# Patient Record
Sex: MALE | Race: WHITE | Employment: STUDENT | URBAN - METROPOLITAN AREA
[De-identification: names, ages, dates, MRNs, and addresses within clinical notes are randomized per-mention and may not be internally consistent; named-entity substitution may affect disease eponyms.]

---

## 2017-03-13 ENCOUNTER — TELEPHONE (OUTPATIENT)
Dept: PEDIATRICS CLINIC | Facility: CLINIC | Age: 22
End: 2017-03-13

## 2017-03-13 NOTE — TELEPHONE ENCOUNTER
States was @ [facility] given RX motrin, cough meds, Tamiflu, now vomitting,diarrhea, advised to call [facility]

## 2017-03-14 ENCOUNTER — TELEPHONE (OUTPATIENT)
Dept: PEDIATRICS CLINIC | Facility: CLINIC | Age: 22
End: 2017-03-14

## 2017-03-14 NOTE — TELEPHONE ENCOUNTER
Pt was seen in urgent care on Sunday for fever, ST, cough. Started on Tamiflu. After starting Tamiflu pt developed vomiting and diarrhea so he stopped Tamiflu. Still with fever and cough. Informed mom will review with [facility] if okay to see patient.  Advised mo

## 2017-03-15 ENCOUNTER — TELEPHONE (OUTPATIENT)
Dept: PEDIATRICS CLINIC | Facility: CLINIC | Age: 22
End: 2017-03-15

## 2017-03-15 ENCOUNTER — OFFICE VISIT (OUTPATIENT)
Dept: PEDIATRICS CLINIC | Facility: CLINIC | Age: 22
End: 2017-03-15

## 2017-03-15 VITALS
TEMPERATURE: 98 F | WEIGHT: 232 LBS | DIASTOLIC BLOOD PRESSURE: 72 MMHG | RESPIRATION RATE: 16 BRPM | BODY MASS INDEX: 27 KG/M2 | SYSTOLIC BLOOD PRESSURE: 107 MMHG | HEART RATE: 80 BPM

## 2017-03-15 DIAGNOSIS — B34.9 VIRAL INFECTION: Primary | ICD-10-CM

## 2017-03-15 DIAGNOSIS — J03.90 TONSILLITIS: ICD-10-CM

## 2017-03-15 PROCEDURE — 99213 OFFICE O/P EST LOW 20 MIN: CPT | Performed by: PEDIATRICS

## 2017-03-15 RX ORDER — OSELTAMIVIR PHOSPHATE 75 MG/1
CAPSULE ORAL
Refills: 0 | COMMUNITY
Start: 2017-03-12 | End: 2020-07-20

## 2017-03-15 RX ORDER — BENZONATATE 200 MG/1
CAPSULE ORAL
Refills: 0 | COMMUNITY
Start: 2017-03-12 | End: 2020-07-20

## 2017-03-15 RX ORDER — IBUPROFEN 800 MG/1
TABLET ORAL
Refills: 0 | COMMUNITY
Start: 2017-03-12

## 2017-03-15 NOTE — TELEPHONE ENCOUNTER
[facility], faxed request letter requesting labs for strep cx and flu result from 3/12 to fax # [phone], left message for mom awaiting result for [facility] to review.

## 2017-03-15 NOTE — PROGRESS NOTES
Lani Martinez is a 25year old male who was brought in for this visit. History was provided by the mother.   HPI:   Patient presents with:  Fever: began on 3/11 with fever; T104; also had congestion, cough; ST began later \"from coughing\"  Lost his voice visit:    Viral infection    Tonsillitis    likely adenovirus with all the symptoms he is experiencing  PLAN:  Patient Instructions   Fever is a normal mechanism of the body to help fight infection.  It slows the person down, promoting rest, and tolbert the would be a child with a history of febrile seizures)  · It is best to avoid the use of aspirin due to the chance of serious complications that can occur if used with certain infections (chicken pox and influenza)    Here are a few things that may help a co

## 2017-03-15 NOTE — TELEPHONE ENCOUNTER
Mother states the results [facility] wanted from [address] has to be requested by doctor not pt.  Pl adv

## 2017-03-15 NOTE — PATIENT INSTRUCTIONS
Fever is a normal mechanism of the body to help fight infection. It slows the person down, promoting rest, and tolbert the body's immune system. Common fevers will NOT cause brain damage.  Children with fever will be fussy and sluggish but they should perk u complications that can occur if used with certain infections (chicken pox and influenza)    Here are a few things that may help a cough:  · Cool vaporizers/humidifiers may help during the winter when the air is dry but I do not recommend them in the spring

## 2017-03-16 NOTE — TELEPHONE ENCOUNTER
Informed mom received strep cx result from [facility] urgent care, result negative and had reviewed with [facility], mom said they did a rapid flu test in the office and it was negative for influenza A and B.  Advised mom to follow [facility] instructions, call if no major i

## 2017-07-24 ENCOUNTER — OFFICE VISIT (OUTPATIENT)
Dept: INTERNAL MEDICINE CLINIC | Facility: CLINIC | Age: 22
End: 2017-07-24

## 2017-07-24 VITALS
WEIGHT: 232.63 LBS | HEIGHT: 78 IN | HEART RATE: 52 BPM | RESPIRATION RATE: 16 BRPM | DIASTOLIC BLOOD PRESSURE: 77 MMHG | SYSTOLIC BLOOD PRESSURE: 121 MMHG | BODY MASS INDEX: 26.92 KG/M2

## 2017-07-24 DIAGNOSIS — Z00.00 ANNUAL PHYSICAL EXAM: Primary | ICD-10-CM

## 2017-07-24 PROCEDURE — 99395 PREV VISIT EST AGE 18-39: CPT | Performed by: INTERNAL MEDICINE

## 2017-07-24 NOTE — PROGRESS NOTES
HPI:    Patient ID: Smiley Heath is a 25year old male. HPI     Annual Preventative Exam  Art Antoine arrives today for annual preventative physical examination. The patient complains of no new problems and has 0/10 pain.        Review of Systems   Co rhythm and normal heart sounds. Exam reveals no gallop and no friction rub. No murmur heard. Pulmonary/Chest: Effort normal and breath sounds normal. No respiratory distress. He has no wheezes. He has no rales. Abdominal: Soft.  Bowel sounds are norm instructions (if applicable) and agree that the record reflects my personal performance and is accurate and complete.   Rosalie Dickerson MD, 7/24/2017, 4:43 PM

## 2017-10-12 ENCOUNTER — TELEPHONE (OUTPATIENT)
Dept: INTERNAL MEDICINE CLINIC | Facility: CLINIC | Age: 22
End: 2017-10-12

## 2017-10-14 NOTE — TELEPHONE ENCOUNTER
Please offer appointment for Flu clinic today at [facility] or [facility].

## 2020-02-18 ENCOUNTER — TELEPHONE (OUTPATIENT)
Dept: PEDIATRICS CLINIC | Facility: CLINIC | Age: 25
End: 2020-02-18

## 2020-02-18 NOTE — TELEPHONE ENCOUNTER
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 Chito Castillo is having trouble registering for college classes because the college needs proof that Chito Castillo had varicella disease in his childhood.      IM-please keep an eye out for a college immunizati

## 2020-02-19 NOTE — TELEPHONE ENCOUNTER
Pt needs to establish care with an IM PCP before any forms will be signed or orders generated. LMTCB    ASHLEY - please assist with scheduling appt. (pt can see Cee Ramirez to settle issue with form if he cannot wait to see MOE).

## 2020-07-20 ENCOUNTER — OFFICE VISIT (OUTPATIENT)
Dept: INTERNAL MEDICINE CLINIC | Facility: CLINIC | Age: 25
End: 2020-07-20
Payer: COMMERCIAL

## 2020-07-20 VITALS
WEIGHT: 261.5 LBS | SYSTOLIC BLOOD PRESSURE: 122 MMHG | HEART RATE: 72 BPM | DIASTOLIC BLOOD PRESSURE: 84 MMHG | HEIGHT: 78 IN | BODY MASS INDEX: 30.25 KG/M2

## 2020-07-20 DIAGNOSIS — Z00.00 ROUTINE HISTORY AND PHYSICAL EXAMINATION OF ADULT: Primary | ICD-10-CM

## 2020-07-20 PROCEDURE — 99385 PREV VISIT NEW AGE 18-39: CPT | Performed by: INTERNAL MEDICINE

## 2020-07-21 NOTE — PROGRESS NOTES
Mayme Canavan is a 27year old male who presents for a complete physical exam.   HPI:   Pt complains of nothing.       Immunization History  Administered            Date(s) Administered    >=3 YRS FLUZONE OR FLUARIX QUAD PRESERVE FREE SINGLE DOSE (59912) F AST  No results found for: ALT  No results found for: PSA     Current Outpatient Medications   Medication Sig Dispense Refill   • ibuprofen 800 MG Oral Tab   0      Past Medical History:   Diagnosis Date   • Umbilical hernia 0581      Past Surgical History mass  LUNGS: clear to auscultation  CARDIO: RRR without murmur  GI: good BS's,no masses, HSM or tenderness  : two descended testes,no masses,no hernia,no penile lesions  RECTAL: good rectal tone, prostate shows no masses, stool is OB negative  M[place]

## 2020-08-18 ENCOUNTER — LAB ENCOUNTER (OUTPATIENT)
Dept: LAB | Facility: REFERENCE LAB | Age: 25
End: 2020-08-18
Attending: INTERNAL MEDICINE
Payer: COMMERCIAL

## 2020-08-18 DIAGNOSIS — Z00.00 ROUTINE HISTORY AND PHYSICAL EXAMINATION OF ADULT: ICD-10-CM

## 2020-08-18 LAB
ALBUMIN SERPL-MCNC: 4 G/DL (ref 3.4–5)
ALBUMIN/GLOB SERPL: 1.1 {RATIO} (ref 1–2)
ALP LIVER SERPL-CCNC: 84 U/L (ref 45–117)
ALT SERPL-CCNC: 28 U/L (ref 16–61)
ANION GAP SERPL CALC-SCNC: 5 MMOL/L (ref 0–18)
AST SERPL-CCNC: 11 U/L (ref 15–37)
BASOPHILS # BLD AUTO: 0.03 X10(3) UL (ref 0–0.2)
BASOPHILS NFR BLD AUTO: 0.4 %
BILIRUB SERPL-MCNC: 0.6 MG/DL (ref 0.1–2)
BILIRUB UR QL: NEGATIVE
BUN BLD-MCNC: 10 MG/DL (ref 7–18)
BUN/CREAT SERPL: 10.1 (ref 10–20)
CALCIUM BLD-MCNC: 8.7 MG/DL (ref 8.5–10.1)
CHLORIDE SERPL-SCNC: 107 MMOL/L (ref 98–112)
CHOLEST SMN-MCNC: 132 MG/DL (ref ?–200)
CLARITY UR: CLEAR
CO2 SERPL-SCNC: 27 MMOL/L (ref 21–32)
COLOR UR: YELLOW
CREAT BLD-MCNC: 0.99 MG/DL (ref 0.7–1.3)
DEPRECATED RDW RBC AUTO: 37.8 FL (ref 35.1–46.3)
EOSINOPHIL # BLD AUTO: 0.4 X10(3) UL (ref 0–0.7)
EOSINOPHIL NFR BLD AUTO: 5.7 %
ERYTHROCYTE [DISTWIDTH] IN BLOOD BY AUTOMATED COUNT: 12.3 % (ref 11–15)
GLOBULIN PLAS-MCNC: 3.7 G/DL (ref 2.8–4.4)
GLUCOSE BLD-MCNC: 74 MG/DL (ref 70–99)
GLUCOSE UR-MCNC: NEGATIVE MG/DL
HCT VFR BLD AUTO: 48.1 % (ref 39–53)
HDLC SERPL-MCNC: 45 MG/DL (ref 40–59)
HGB BLD-MCNC: 17 G/DL (ref 13–17.5)
HGB UR QL STRIP.AUTO: NEGATIVE
IMM GRANULOCYTES # BLD AUTO: 0.03 X10(3) UL (ref 0–1)
IMM GRANULOCYTES NFR BLD: 0.4 %
KETONES UR-MCNC: NEGATIVE MG/DL
LDLC SERPL CALC-MCNC: 60 MG/DL (ref ?–100)
LEUKOCYTE ESTERASE UR QL STRIP.AUTO: NEGATIVE
LYMPHOCYTES # BLD AUTO: 2.34 X10(3) UL (ref 1–4)
LYMPHOCYTES NFR BLD AUTO: 33.6 %
M PROTEIN MFR SERPL ELPH: 7.7 G/DL (ref 6.4–8.2)
MCH RBC QN AUTO: 30.1 PG (ref 26–34)
MCHC RBC AUTO-ENTMCNC: 35.3 G/DL (ref 31–37)
MCV RBC AUTO: 85.3 FL (ref 80–100)
MONOCYTES # BLD AUTO: 0.57 X10(3) UL (ref 0.1–1)
MONOCYTES NFR BLD AUTO: 8.2 %
NEUTROPHILS # BLD AUTO: 3.59 X10 (3) UL (ref 1.5–7.7)
NEUTROPHILS # BLD AUTO: 3.59 X10(3) UL (ref 1.5–7.7)
NEUTROPHILS NFR BLD AUTO: 51.7 %
NITRITE UR QL STRIP.AUTO: NEGATIVE
NONHDLC SERPL-MCNC: 87 MG/DL (ref ?–130)
OSMOLALITY SERPL CALC.SUM OF ELEC: 286 MOSM/KG (ref 275–295)
PATIENT FASTING Y/N/NP: YES
PATIENT FASTING Y/N/NP: YES
PH UR: 6 [PH] (ref 5–8)
PLATELET # BLD AUTO: 211 10(3)UL (ref 150–450)
POTASSIUM SERPL-SCNC: 4.1 MMOL/L (ref 3.5–5.1)
PROT UR-MCNC: NEGATIVE MG/DL
RBC # BLD AUTO: 5.64 X10(6)UL (ref 4.3–5.7)
SODIUM SERPL-SCNC: 139 MMOL/L (ref 136–145)
SP GR UR STRIP: 1.03 (ref 1–1.03)
TRIGL SERPL-MCNC: 136 MG/DL (ref 30–149)
TSI SER-ACNC: 1.99 MIU/ML (ref 0.36–3.74)
UROBILINOGEN UR STRIP-ACNC: <2
VLDLC SERPL CALC-MCNC: 27 MG/DL (ref 0–30)
WBC # BLD AUTO: 7 X10(3) UL (ref 4–11)

## 2020-08-18 PROCEDURE — 36415 COLL VENOUS BLD VENIPUNCTURE: CPT

## 2020-08-18 PROCEDURE — 82306 VITAMIN D 25 HYDROXY: CPT | Performed by: INTERNAL MEDICINE

## 2020-08-18 PROCEDURE — 85025 COMPLETE CBC W/AUTO DIFF WBC: CPT

## 2020-08-18 PROCEDURE — 80061 LIPID PANEL: CPT

## 2020-08-18 PROCEDURE — 81003 URINALYSIS AUTO W/O SCOPE: CPT

## 2020-08-18 PROCEDURE — 80053 COMPREHEN METABOLIC PANEL: CPT

## 2020-08-18 PROCEDURE — 84443 ASSAY THYROID STIM HORMONE: CPT

## 2020-08-19 LAB — 25(OH)D3 SERPL-MCNC: 34.9 NG/ML (ref 30–100)

## 2024-12-23 ENCOUNTER — OFFICE VISIT (OUTPATIENT)
Dept: OTOLARYNGOLOGY | Facility: CLINIC | Age: 29
End: 2024-12-23

## 2024-12-23 VITALS — HEIGHT: 78 IN | BODY MASS INDEX: 26.61 KG/M2 | WEIGHT: 230 LBS

## 2024-12-23 DIAGNOSIS — H66.011 SPONTANEOUS RUPTURE OF TYMPANIC MEMBRANE OF RIGHT EAR CONCURRENT WITH AND DUE TO ACUTE SUPPURATIVE OTITIS MEDIA: ICD-10-CM

## 2024-12-23 DIAGNOSIS — H60.391 OTHER INFECTIVE ACUTE OTITIS EXTERNA OF RIGHT EAR: ICD-10-CM

## 2024-12-23 DIAGNOSIS — H72.91 PERFORATION OF RIGHT TYMPANIC MEMBRANE: Primary | ICD-10-CM

## 2024-12-23 PROCEDURE — 92504 EAR MICROSCOPY EXAMINATION: CPT | Performed by: OTOLARYNGOLOGY

## 2024-12-23 PROCEDURE — 99203 OFFICE O/P NEW LOW 30 MIN: CPT | Performed by: OTOLARYNGOLOGY

## 2024-12-23 RX ORDER — CIPROFLOXACIN AND DEXAMETHASONE 3; 1 MG/ML; MG/ML
4 SUSPENSION/ DROPS AURICULAR (OTIC) 2 TIMES DAILY
Qty: 7.5 ML | Refills: 0 | Status: SHIPPED | OUTPATIENT
Start: 2024-12-23 | End: 2024-12-30

## 2024-12-23 RX ORDER — CITALOPRAM HYDROBROMIDE 20 MG/1
20 TABLET ORAL DAILY
COMMUNITY
Start: 2022-10-17

## 2024-12-23 NOTE — PROGRESS NOTES
NEW PATIENT PROGRESS NOTE  OTOLOGY/OTOLARYNGOLOGY    REF MD:  No referring provider defined for this encounter.     PCP: No primary care provider on file.    CHIEF COMPLAINT:    Chief Complaint   Patient presents with    Hearing Loss     Hearing loss and drainage from right ear after ear infection approx 4 weeks ago.        HISTORY OF PRESENT ILLNESS: Arnoldo Ball is a 29 year old male who presents for evaluation of hearing loss after ear infection. Patient was diagnosed with a middle ear infection, but did not start augmentin course as prescribed. Patient felt the infection may self resolve. The pain worsened and he started having drainage from his right ear. He then took the augmentin 10 day course. He now continues to have otorrhea and congestion in the ear. The right ear hearing is diminished. His mother is an audiologist in our practice. He had an audiogram today. He had one set of tubes at 16 months old. Patient's issue started with a URI. Denies vertigo, dizziness, tinnitus.    PAST MEDICAL HISTORY:    Past Medical History:    Umbilical hernia       PAST SURGICAL HISTORY:    Past Surgical History:   Procedure Laterality Date    Hernia surgery      Umbilical Hernia       Medications Ordered Prior to Encounter[1]    Allergies: Allergies[2]    SOCIAL HISTORY:    Social History     Tobacco Use    Smoking status: Never    Smokeless tobacco: Never   Substance Use Topics    Alcohol use: Yes     Alcohol/week: 0.0 standard drinks of alcohol       Family History   Problem Relation Age of Onset    Cancer Father         Cancer-Thyroid     Arrhythmia Other         Negative, Family History of     Other (Sudden death under age 40) Other         Negative, Family history of     Other (aortic aneurysm) Paternal Grandfather     Diabetes Neg     Heart Disorder Neg        REVIEW OF SYSTEMS:   Positives are in bold  Neuro: Headache, facial weakness, facial numbness, neck pain, vertigo  ENT: Hearing change, tinnitus, otorrhea,  otalgia, aural fullness, ear pressure, vertigo, imbalance  Sinus pressure, rhinorrhea, congestion, facial pain, jaw pain, dysphagia, odynophagia, sore throat, voice changes, shortness of breath    EXAMINATION:  I washed my hands with an alcohol-based hand gel prior to examination  Constitutional:   --Vitals: Height 6' 6\" (1.981 m), weight 230 lb (104.3 kg).  --General: no apparent distress, well-developed, conversant  Psych: affect pleasant and appropriate for age, alert and oriented  Neuro: Facial movement normal bilateral  Respiratory: No stridor, stertor or increased work of breathing  ENT:  --Ear: The bilateral ears were examined under binocular microscopy  Right ear microscopic exam:  Pinna: Normal, no lesions or masses.  Mastoid: Nontender on palpation.   External auditory canal: Edematous with purulence - suctioned no masses or lesions.  Tympanic membrane: Thickened with myringosclerosis, inferior 2% tympanic membrane perforation, no lesions, normal landmarks.  Middle ear: with purulent fluid draining .    Left ear microscopic exam:  Pinna: Normal, no lesions or masses.  Mastoid: Nontender on palpation.   External auditory canal: Clear, no masses or lesions.  Tympanic membrane: Intact with myringosclerosis, no lesions, normal landmarks.  Middle ear: Aerated.    ASSESSMENT/PLAN:  Arnoldo Ball is a 29 year old male with     ICD-10-CM   1. Perforation of right tympanic membrane  H72.91   2. Spontaneous rupture of tympanic membrane of right ear concurrent with and due to acute suppurative otitis media  H66.011   3. Other infective acute otitis externa of right ear  H60.391        IMPRESSION:  Right acute otitis media with perforation   Secondary acute otitis externa   Right tympanic membrane perforation 5% central, inferior     PLAN:  -Dry ear precautions  -Ciprodex otic drops, 4 drops BID to the right ear for 7 days  -Patient lives in , [place] - leaving in 1 week. Recommend follow-up on 12/27/2024 for ear  recheck  -Discussed with patient tympanic membrane perforation has a good chance of spontaneously resolving if infection is treated.   -Once infection is resolved consider audiogram    Situation reviewed with the patient in detail.    Fan Joy MD  Otology/Otolaryngology  [facility]   [address] [facility] [address] [place] [address]  Phone [phone]  Fax [phone]           [1]   Current Outpatient Medications on File Prior to Visit   Medication Sig Dispense Refill    citalopram 20 MG Oral Tab Take 1 tablet (20 mg total) by mouth daily.      ibuprofen 800 MG Oral Tab   0     No current facility-administered medications on file prior to visit.   [2]   Allergies  Allergen Reactions    Molds & Smuts Runny nose

## 2024-12-27 ENCOUNTER — OFFICE VISIT (OUTPATIENT)
Dept: OTOLARYNGOLOGY | Facility: CLINIC | Age: 29
End: 2024-12-27

## 2024-12-27 VITALS — WEIGHT: 230 LBS | BODY MASS INDEX: 26.61 KG/M2 | HEIGHT: 78 IN

## 2024-12-27 DIAGNOSIS — H60.391 OTHER INFECTIVE ACUTE OTITIS EXTERNA OF RIGHT EAR: Primary | ICD-10-CM

## 2024-12-27 PROCEDURE — 92504 EAR MICROSCOPY EXAMINATION: CPT | Performed by: OTOLARYNGOLOGY

## 2024-12-27 PROCEDURE — 99213 OFFICE O/P EST LOW 20 MIN: CPT | Performed by: OTOLARYNGOLOGY

## 2024-12-27 NOTE — PROGRESS NOTES
PROGRESS NOTE  OTOLOGY/OTOLARYNGOLOGY    REF MD:  No referring provider defined for this encounter.     PCP: No primary care provider on file.    CHIEF COMPLAINT:    Chief Complaint   Patient presents with    Ear Problem     F/u ear infection per pt states hearing loss has not fully resolved      INTERVAL HX: Since last visit patient has used ciprodex drops with improvement. He is going out of town this weekend and then will be returning home to [place].       HISTORY OF PRESENT ILLNESS: Arnoldo Ball is a 29 year old male who presents for evaluation of hearing loss after ear infection. Patient was diagnosed with a middle ear infection, but did not start augmentin course as prescribed. Patient felt the infection may self resolve. The pain worsened and he started having drainage from his right ear. He then took the augmentin 10 day course. He now continues to have otorrhea and congestion in the ear. The right ear hearing is diminished. His mother is an audiologist in our practice. He had an audiogram today. He had one set of tubes at 16 months old. Patient's issue started with a URI. Denies vertigo, dizziness, tinnitus.    PAST MEDICAL HISTORY:    Past Medical History:    Umbilical hernia       PAST SURGICAL HISTORY:    Past Surgical History:   Procedure Laterality Date    Hernia surgery      Umbilical Hernia       Medications Ordered Prior to Encounter[1]    Allergies: Allergies[2]    SOCIAL HISTORY:    Social History     Tobacco Use    Smoking status: Never    Smokeless tobacco: Never   Substance Use Topics    Alcohol use: Yes     Alcohol/week: 0.0 standard drinks of alcohol       Family History   Problem Relation Age of Onset    Cancer Father         Cancer-Thyroid     Arrhythmia Other         Negative, Family History of     Other (Sudden death under age 40) Other         Negative, Family history of     Other (aortic aneurysm) Paternal Grandfather     Diabetes Neg     Heart Disorder Neg        REVIEW OF  SYSTEMS:   Positives are in bold  Neuro: Headache, facial weakness, facial numbness, neck pain, vertigo  ENT: Hearing change, tinnitus, otorrhea, otalgia, aural fullness, ear pressure, vertigo, imbalance  Sinus pressure, rhinorrhea, congestion, facial pain, jaw pain, dysphagia, odynophagia, sore throat, voice changes, shortness of breath    EXAMINATION:  I washed my hands with an alcohol-based hand gel prior to examination  Constitutional:   --Vitals: Height 6' 6\" (1.981 m), weight 230 lb (104.3 kg).  --General: no apparent distress, well-developed, conversant  Respiratory: No stridor, stertor or increased work of breathing  ENT:  --Ear: The bilateral ears were examined under binocular microscopy  Right ear microscopic exam:  Pinna: Normal, no lesions or masses.  Mastoid: Nontender on palpation.   External auditory canal: Scant thin otorrhea, possible fungal elements   Tympanic membrane: Thickened with myringosclerosis, inferior 2% tympanic membrane perforation, no lesions, normal landmarks.  Middle ear: middle ear mucosa edematous, aerated     Left ear microscopic exam:  Pinna: Normal, no lesions or masses.  Mastoid: Nontender on palpation.   External auditory canal: Clear, no masses or lesions.  Tympanic membrane: Intact with myringosclerosis, no lesions, normal landmarks.  Middle ear: Aerated.    ASSESSMENT/PLAN:  Arnoldo Ball is a 29 year old male with     ICD-10-CM   1. Other infective acute otitis externa of right ear  H60.391        IMPRESSION:  Right acute otitis media with perforation   Secondary acute otitis externa   Right tympanic membrane perforation 5% central, inferior     PLAN:  -Continue dry ear precautions  -Complete course of ciprodex otic drops, 4 drops BID to the right ear for 7 days (has completed 5 days so far)  -Culture of right ear taken as he may also have a secondary fungal infection   -Discussed with patient tympanic membrane perforation has a good chance of spontaneously resolving if  infection is treated.   -Once infection is resolved consider audiogram  -Discussed with patient that he should establish care with an ENT where he lives for follow-up    Situation reviewed with the patient in detail.    Fan Joy MD  Otology/Otolaryngology  [facility]   [address] [facility] [address] [place] [address]  Phone [phone]  Fax [phone]           [1]   Current Outpatient Medications on File Prior to Visit   Medication Sig Dispense Refill    citalopram 20 MG Oral Tab Take 1 tablet (20 mg total) by mouth daily.      ciprofloxacin-dexamethasone 0.3-0.1 % Otic Suspension Place 4 drops into the right ear 2 (two) times daily for 7 days. 7.5 mL 0    ibuprofen 800 MG Oral Tab   0     No current facility-administered medications on file prior to visit.   [2]   Allergies  Allergen Reactions    Molds & Smuts Runny nose

## 2024-12-31 ENCOUNTER — PATIENT MESSAGE (OUTPATIENT)
Dept: OTOLARYNGOLOGY | Facility: CLINIC | Age: 29
End: 2024-12-31

## 2024-12-31 DIAGNOSIS — H72.91 PERFORATION OF RIGHT TYMPANIC MEMBRANE: ICD-10-CM

## 2024-12-31 DIAGNOSIS — H60.391 OTHER INFECTIVE ACUTE OTITIS EXTERNA OF RIGHT EAR: Primary | ICD-10-CM

## 2025-01-02 RX ORDER — CLINDAMYCIN HYDROCHLORIDE 300 MG/1
300 CAPSULE ORAL 3 TIMES DAILY
Qty: 21 CAPSULE | Refills: 0 | Status: SHIPPED | OUTPATIENT
Start: 2025-01-02

## 2025-01-06 NOTE — TELEPHONE ENCOUNTER
Arnoldo Perales wants the ABX to be sent to the following pharmacy: [facility] [address] [place] [address], [place] [address]

## (undated) NOTE — Clinical Note
3/15/2017              Pasha Diaz, : 1995        [address] [facility] [address]         To Whom It May Concern,    We are requesting for lab results for strep culture and influenza testing and any other pertinent information

## (undated) NOTE — MR AVS SNAPSHOT
[facility]  [address]  [phone]               Thank you for choosing us for your health care visit with Kirby Perez MD.  We are glad to serve you and happy to provide you with this summa · We will want to recheck your child if the fever is out of the ordinary - > 5 days in duration, > 104.9, returns after a period of a few days without fever or there is a significant worsening of symptoms  · We do not recommend doing it routinely, but you · Your child can eat normally and drink milk during a cold/cough       Allergies as of Mar 15, 2017     No Known Allergies                Today's Vital Signs     BP Pulse Temp Weight          107/72 mmHg 80 97.8 °F (36.6 °C) (Tympanic) 105.235 kg (232 lb) including white bread, rice and pasta   Eat plenty of protein, keep the fat content low Sugars:  sodas and sports drinks, candies and desserts   Eat plenty of low-fat dairy products High fat meats and dairy   Choose whole grain products Foods high in sodiu